# Patient Record
Sex: MALE | Race: BLACK OR AFRICAN AMERICAN | NOT HISPANIC OR LATINO | Employment: FULL TIME | URBAN - METROPOLITAN AREA
[De-identification: names, ages, dates, MRNs, and addresses within clinical notes are randomized per-mention and may not be internally consistent; named-entity substitution may affect disease eponyms.]

---

## 2023-06-26 ENCOUNTER — HOSPITAL ENCOUNTER (EMERGENCY)
Facility: CLINIC | Age: 38
Discharge: HOME | End: 2023-06-26
Attending: EMERGENCY MEDICINE | Admitting: EMERGENCY MEDICINE
Payer: COMMERCIAL

## 2023-06-26 VITALS
RESPIRATION RATE: 16 BRPM | HEIGHT: 68 IN | SYSTOLIC BLOOD PRESSURE: 106 MMHG | OXYGEN SATURATION: 97 % | BODY MASS INDEX: 21.22 KG/M2 | WEIGHT: 140 LBS | HEART RATE: 100 BPM | TEMPERATURE: 98.2 F | DIASTOLIC BLOOD PRESSURE: 71 MMHG

## 2023-06-26 DIAGNOSIS — H10.30 ACUTE CONJUNCTIVITIS, UNSPECIFIED ACUTE CONJUNCTIVITIS TYPE, UNSPECIFIED LATERALITY: ICD-10-CM

## 2023-06-26 PROCEDURE — 99283 EMERGENCY DEPT VISIT LOW MDM: CPT

## 2023-06-26 RX ORDER — ERYTHROMYCIN 5 MG/G
0.5 OINTMENT OPHTHALMIC 4 TIMES DAILY
Qty: 3.5 G | Refills: 0 | Status: SHIPPED | OUTPATIENT
Start: 2023-06-26 | End: 2023-08-01

## 2023-06-26 ASSESSMENT — ENCOUNTER SYMPTOMS
EYE DISCHARGE: 1
HEADACHES: 1
EYE REDNESS: 1
FEVER: 0
EYE ITCHING: 1

## 2023-06-27 NOTE — ED TRIAGE NOTES
Left eye redness, itching and clear drainage. Kids at home had similar symptoms       Triage Assessment     Row Name 06/26/23 2112       Triage Assessment (Adult)    Airway WDL WDL       Respiratory WDL    Respiratory WDL WDL       Skin Circulation/Temperature WDL    Skin Circulation/Temperature WDL WDL       Cardiac WDL    Cardiac WDL WDL       Peripheral/Neurovascular WDL    Peripheral Neurovascular WDL WDL       Cognitive/Neuro/Behavioral WDL    Cognitive/Neuro/Behavioral WDL WDL

## 2023-06-27 NOTE — ED PROVIDER NOTES
EMERGENCY DEPARTMENT ENCOUNTER      NAME: Jose Dang  AGE: 38 year old male  YOB: 1985  MRN: [id number]  EVALUATION DATE & TIME: 6/26/2023  9:40 PM    PCP: No Ref-Primary, Physician    ED PROVIDER: Markos Murray MD    Chief Complaint   Patient presents with     Eye Problem     Left      FINAL IMPRESSION:  1. Acute conjunctivitis, unspecified acute conjunctivitis type, unspecified laterality      ED COURSE & MEDICAL DECISION MAKING:    Pertinent Labs & Imaging studies reviewed. (See chart for details)  38 year old male presents to the Emergency Department for evaluation of left eye irritation redness and discharge.  Multiple family members at home sick with similar symptoms.  No other red flag symptoms on examination see below for further details on eye exam.  Reported no visual changes anterior chamber was clear visual acuity in visual fields unremarkable.  Most likely infectious mediated.  Our plan of care at this point is for discharge with monitoring of symptoms.  If persistent can start erythromycin ophthalmologic eye ointment although I did inform the patient that this was most likely to be viral mediated process I discussed with him expected timeframe and management resolution.  Patient comfortable to plan of care.  Return precaution discussed prior to discharge.     9:55 PM Met with and introduced myself to the patient. Discussed history and plan of care.    Medical Decision Making    History:    Supplemental history from: Documented in chart, if applicable    External Record(s) reviewed: Documented in chart, if applicable.    Work Up:    Chart documentation includes differential considered and any EKGs or imaging independently interpreted by provider, where specified.    In additional to work up documented, I considered the following work up: Documented in chart, if applicable.    External consultation:    Discussion of management with another provider: Documented in chart, if  applicable    Complicating factors:    Care impacted by chronic illness: N/A    Care affected by social determinants of health: N/A    Disposition considerations: Discharge. I prescribed additional prescription strength medication(s) as charted. See documentation for any additional details.    At the conclusion of the encounter I discussed the results of all of the tests and the disposition. The questions were answered. The patient or family acknowledged understanding and was agreeable with the care plan.     MEDICATIONS GIVEN IN THE EMERGENCY:  Medications - No data to display    NEW PRESCRIPTIONS STARTED AT TODAY'S ER VISIT  Discharge Medication List as of 6/26/2023 10:03 PM      START taking these medications    Details   erythromycin (ROMYCIN) 5 MG/GM ophthalmic ointment Place 0.5 inches Into the left eye 4 times dailyDisp-3.5 g, R-0Local Print                =================================================================    HPI    Patient information was obtained from: Patient    Use of : N/A       Jose Dang is a 38 year old male with a pertinent history of GERD who presents to this ED via walk in for evaluation of eye problem.    The patient reports left eye pain, redness, itching, and clear drainage that began earlier today. Endorses an associated headache. States that his children at home have similar symptoms. No medical problems noted.    Denies fever, vision changes, or any other complaints at this time.    REVIEW OF SYSTEMS   Review of Systems   Constitutional: Negative for fever.   Eyes: Positive for discharge (left), redness (left) and itching (left). Negative for visual disturbance.   Neurological: Positive for headaches.   All other systems reviewed and are negative.     PAST MEDICAL HISTORY:  Past Medical History:   Diagnosis Date     Gastro-oesophageal reflux disease        PAST SURGICAL HISTORY:  History reviewed. No pertinent surgical history.        CURRENT MEDICATIONS:   "  erythromycin (ROMYCIN) 5 MG/GM ophthalmic ointment  HYDROcodone-acetaminophen (NORCO) 5-325 MG per tablet  Ranitidine HCl (ZANTAC PO)        ALLERGIES:  No Known Allergies    FAMILY HISTORY:  History reviewed. No pertinent family history.    SOCIAL HISTORY:   Social History     Socioeconomic History     Marital status:    Tobacco Use     Smoking status: Never   Substance and Sexual Activity     Alcohol use: No     Drug use: No     Comment: noka piupe     Sexual activity: Yes     Partners: Female       PHYSICAL EXAM    VITAL SIGNS: /71   Pulse 100   Temp 98.2  F (36.8  C) (Oral)   Resp 16   Ht 1.727 m (5' 8\")   Wt 63.5 kg (140 lb)   SpO2 97%   BMI 21.29 kg/m    Constitutional:  Well developed, well nourished   EYES: Conjunctival injection on the left side anterior chambers clear pupils equal and reactive extraocular movements intact without eliciting any discomfort visual fields grossly normal visual acuity grossly normal.  No other concerning exam findings.  Watery discharge noted primarily in the medial aspect of the eye.  HENT: Atraumatic, normocephalic, bilateral external ears normal.  Oropharynx moist. Nose normal.   Neck: Normal ROM , Supple   Respiratory:  No respiratory distress, normal nonlabored respirations.   Cardiovascular:  Distal perfusion appears intact  Musculoskeletal:  No edema appreciated, Good range of motion in all major joints.   Integument:  Warm, Dry, No erythema, No rash.   Neurologic:  Alert and oriented. No focal deficits noted.  Ambulatory  Psychiatric:  Affect normal, Judgment normal, Mood normal.    I, Lynette Robin, am serving as a scribe to document services personally performed by Markos Murray MD, based on my observation and the provider's statements to me. I, Markos Murray MD, attest that Lynette Robin is acting in a scribe capacity, has observed my performance of the services and has documented them in accordance with my direction.    Markos Murray, " MD  [facility] EMERGENCY ROOM  [address] [place] [address]  [phone]     Markos Murray MD  06/26/23 8567

## 2023-06-28 ENCOUNTER — OFFICE VISIT (OUTPATIENT)
Dept: FAMILY MEDICINE | Facility: CLINIC | Age: 38
End: 2023-06-28
Payer: COMMERCIAL

## 2023-06-28 VITALS
SYSTOLIC BLOOD PRESSURE: 114 MMHG | TEMPERATURE: 97.9 F | WEIGHT: 147 LBS | HEART RATE: 60 BPM | BODY MASS INDEX: 22.35 KG/M2 | RESPIRATION RATE: 11 BRPM | DIASTOLIC BLOOD PRESSURE: 78 MMHG | OXYGEN SATURATION: 98 %

## 2023-06-28 DIAGNOSIS — J30.89 NON-SEASONAL ALLERGIC RHINITIS, UNSPECIFIED TRIGGER: ICD-10-CM

## 2023-06-28 DIAGNOSIS — H10.13 ALLERGIC CONJUNCTIVITIS, BILATERAL: Primary | ICD-10-CM

## 2023-06-28 PROCEDURE — 99204 OFFICE O/P NEW MOD 45 MIN: CPT | Performed by: PHYSICIAN ASSISTANT

## 2023-06-28 RX ORDER — METHYLPREDNISOLONE 4 MG
TABLET, DOSE PACK ORAL
Qty: 21 TABLET | Refills: 0 | Status: SHIPPED | OUTPATIENT
Start: 2023-06-28 | End: 2023-08-01

## 2023-06-28 RX ORDER — CETIRIZINE HYDROCHLORIDE 10 MG/1
10 TABLET ORAL DAILY
Qty: 30 TABLET | Refills: 0 | Status: SHIPPED | OUTPATIENT
Start: 2023-06-28 | End: 2023-08-01

## 2023-06-28 RX ORDER — OLOPATADINE HYDROCHLORIDE 1 MG/ML
1 SOLUTION/ DROPS OPHTHALMIC 2 TIMES DAILY
Qty: 5 ML | Refills: 0 | Status: SHIPPED | OUTPATIENT
Start: 2023-06-28 | End: 2023-08-01

## 2023-06-28 NOTE — PROGRESS NOTES
Patient presents with:  Pharyngitis: Went to ER two days ago for pink eye, was initially one eye but now its both. Has sore throat now.     (H10.13) Allergic conjunctivitis, bilateral  (primary encounter diagnosis)  Comment:   Plan: olopatadine (PATANOL) 0.1 % ophthalmic solution          STOP erythromycin eye ointment    (J30.89) Non-seasonal allergic rhinitis, unspecified trigger  Comment:   Plan: cetirizine (ZYRTEC) 10 MG tablet,         methylPREDNISolone (MEDROL DOSEPAK) 4 MG tablet        therapy pack          Take the cetirizine every night for the next 3-4 weeks.      Cool washcloth to eyes as needed to relieve itching.  Avoid rubbing eyes with your hands.      Follow up with your primary clinic should symptoms persist or worsen.         SUBJECTIVE:   Jose Dang is a 38 year old male who presents today with persistent red eyes, worsened since using the erythromycin ophthalmic ointment in that was prescribed at the ER 2 days ago.  He complains of persistent watery eyes with itching.  Denies any photophobia.  Denies any fevers.  Denies any purulent drainage from his eyes.    Patient consults with his wife on the phone, during visit.  Also has her ask me questions during visit and asked me to explain the diagnosis to her.    Past Medical History:   Diagnosis Date     Gastro-oesophageal reflux disease          Current Outpatient Medications   Medication Sig Dispense Refill     Multiple Vitamins-Iron (DAILY-BALDO/IRON/BETA-CAROTENE) TABS TAKE 1 TABLET BY MOUTH DAILY. (Patient not taking: Reported on 10/19/2020) 30 tablet 7     Social History     Tobacco Use     Smoking status: Never Smoker     Smokeless tobacco: Never Used   Substance Use Topics     Alcohol use: Not on file     Family History   Problem Relation Age of Onset     Diabetes Mother      Diabetes Father          ROS:    10 point ROS of systems including Constitutional, Eyes, Respiratory, Cardiovascular, Gastroenterology, Genitourinary,  Integumentary, Muscularskeletal, Psychiatric ,neurological were all negative except for pertinent positives noted in my HPI       OBJECTIVE:  /78 (BP Location: Right arm, Patient Position: Sitting, Cuff Size: Adult Regular)   Pulse 60   Temp 97.9  F (36.6  C) (Oral)   Resp 11   Wt 66.7 kg (147 lb)   SpO2 98%   BMI 22.35 kg/m    Physical Exam:  GENERAL APPEARANCE: healthy, alert and no distress  EYES: EOMI,  PERRL, conjunctiva injected bilaterally with watery drainage.  Globes are nontender to palpation.  No photophobia  HENT: ear canals and TM's normal.  Nose with boggy turbinates and clear coryza and mouth without ulcers, erythema or lesions  OP: Postnasal drip  NECK: supple, nontender, no lymphadenopathy  SKIN: no suspicious lesions or rashes

## 2023-06-28 NOTE — PATIENT INSTRUCTIONS
(H10.13) Allergic conjunctivitis, bilateral  (primary encounter diagnosis)  Comment:   Plan: olopatadine (PATANOL) 0.1 % ophthalmic solution          STOP erythromycin eye ointment    (J30.89) Non-seasonal allergic rhinitis, unspecified trigger  Comment:   Plan: cetirizine (ZYRTEC) 10 MG tablet,         methylPREDNISolone (MEDROL DOSEPAK) 4 MG tablet        therapy pack          Take the cetirizine every night for the next 3-4 weeks.      Cool washcloth to eyes as needed to relieve itching.  Avoid rubbing eyes with your hands.      Follow up with your primary clinic should symptoms persist or worsen.

## 2023-08-01 ENCOUNTER — OFFICE VISIT (OUTPATIENT)
Dept: INTERNAL MEDICINE | Facility: CLINIC | Age: 38
End: 2023-08-01
Payer: COMMERCIAL

## 2023-08-01 VITALS
TEMPERATURE: 97 F | OXYGEN SATURATION: 97 % | WEIGHT: 146 LBS | SYSTOLIC BLOOD PRESSURE: 116 MMHG | BODY MASS INDEX: 22.2 KG/M2 | DIASTOLIC BLOOD PRESSURE: 74 MMHG | HEART RATE: 69 BPM

## 2023-08-01 DIAGNOSIS — R30.0 DYSURIA: ICD-10-CM

## 2023-08-01 DIAGNOSIS — Z13.220 SCREENING FOR HYPERLIPIDEMIA: ICD-10-CM

## 2023-08-01 DIAGNOSIS — K64.4 EXTERNAL HEMORRHOIDS: ICD-10-CM

## 2023-08-01 DIAGNOSIS — Z11.3 SCREEN FOR STD (SEXUALLY TRANSMITTED DISEASE): ICD-10-CM

## 2023-08-01 DIAGNOSIS — Z00.00 ROUTINE HISTORY AND PHYSICAL EXAMINATION OF ADULT: Primary | ICD-10-CM

## 2023-08-01 DIAGNOSIS — G89.29 CHRONIC PAIN OF RIGHT KNEE: ICD-10-CM

## 2023-08-01 DIAGNOSIS — K21.00 GASTROESOPHAGEAL REFLUX DISEASE WITH ESOPHAGITIS WITHOUT HEMORRHAGE: ICD-10-CM

## 2023-08-01 DIAGNOSIS — Z11.59 NEED FOR HEPATITIS C SCREENING TEST: ICD-10-CM

## 2023-08-01 DIAGNOSIS — Z11.4 SCREENING FOR HIV (HUMAN IMMUNODEFICIENCY VIRUS): ICD-10-CM

## 2023-08-01 DIAGNOSIS — M25.561 CHRONIC PAIN OF RIGHT KNEE: ICD-10-CM

## 2023-08-01 DIAGNOSIS — Z13.1 SCREENING FOR DIABETES MELLITUS: ICD-10-CM

## 2023-08-01 LAB
ALBUMIN UR-MCNC: NEGATIVE MG/DL
APPEARANCE UR: CLEAR
BACTERIA #/AREA URNS HPF: ABNORMAL /HPF
BILIRUB UR QL STRIP: NEGATIVE
CHOLEST SERPL-MCNC: 263 MG/DL
COLOR UR AUTO: YELLOW
GLUCOSE UR STRIP-MCNC: NEGATIVE MG/DL
HBA1C MFR BLD: 5.9 % (ref 0–5.6)
HDLC SERPL-MCNC: 69 MG/DL
HGB UR QL STRIP: NEGATIVE
KETONES UR STRIP-MCNC: NEGATIVE MG/DL
LDLC SERPL CALC-MCNC: 166 MG/DL
LEUKOCYTE ESTERASE UR QL STRIP: ABNORMAL
NITRATE UR QL: NEGATIVE
NONHDLC SERPL-MCNC: 194 MG/DL
PH UR STRIP: 6 [PH] (ref 5–7)
RBC #/AREA URNS AUTO: ABNORMAL /HPF
SP GR UR STRIP: 1.02 (ref 1–1.03)
SQUAMOUS #/AREA URNS AUTO: ABNORMAL /LPF
TRIGL SERPL-MCNC: 138 MG/DL
UROBILINOGEN UR STRIP-ACNC: 0.2 E.U./DL
WBC #/AREA URNS AUTO: ABNORMAL /HPF

## 2023-08-01 PROCEDURE — 87389 HIV-1 AG W/HIV-1&-2 AB AG IA: CPT | Performed by: INTERNAL MEDICINE

## 2023-08-01 PROCEDURE — 99395 PREV VISIT EST AGE 18-39: CPT | Performed by: INTERNAL MEDICINE

## 2023-08-01 PROCEDURE — 99214 OFFICE O/P EST MOD 30 MIN: CPT | Mod: 25 | Performed by: INTERNAL MEDICINE

## 2023-08-01 PROCEDURE — 36415 COLL VENOUS BLD VENIPUNCTURE: CPT | Performed by: INTERNAL MEDICINE

## 2023-08-01 PROCEDURE — 87491 CHLMYD TRACH DNA AMP PROBE: CPT | Performed by: INTERNAL MEDICINE

## 2023-08-01 PROCEDURE — 80061 LIPID PANEL: CPT | Performed by: INTERNAL MEDICINE

## 2023-08-01 PROCEDURE — 81001 URINALYSIS AUTO W/SCOPE: CPT | Performed by: INTERNAL MEDICINE

## 2023-08-01 PROCEDURE — 86803 HEPATITIS C AB TEST: CPT | Performed by: INTERNAL MEDICINE

## 2023-08-01 PROCEDURE — 87591 N.GONORRHOEAE DNA AMP PROB: CPT | Performed by: INTERNAL MEDICINE

## 2023-08-01 PROCEDURE — 86780 TREPONEMA PALLIDUM: CPT | Performed by: INTERNAL MEDICINE

## 2023-08-01 PROCEDURE — 83036 HEMOGLOBIN GLYCOSYLATED A1C: CPT | Performed by: INTERNAL MEDICINE

## 2023-08-01 RX ORDER — PANTOPRAZOLE SODIUM 40 MG/1
40 TABLET, DELAYED RELEASE ORAL DAILY
Qty: 90 TABLET | Refills: 0 | Status: SHIPPED | OUTPATIENT
Start: 2023-08-01 | End: 2023-10-27

## 2023-08-01 RX ORDER — HYDROCORTISONE ACETATE 25 MG/1
25 SUPPOSITORY RECTAL 2 TIMES DAILY
Qty: 24 SUPPOSITORY | Refills: 0 | Status: SHIPPED | OUTPATIENT
Start: 2023-08-01

## 2023-08-01 ASSESSMENT — ENCOUNTER SYMPTOMS
PALPITATIONS: 0
HEMATURIA: 0
WEAKNESS: 0
DIARRHEA: 0
NERVOUS/ANXIOUS: 0
HEARTBURN: 1
COUGH: 0
MYALGIAS: 0
EYE PAIN: 0
FREQUENCY: 0
FEVER: 0
CHILLS: 0
JOINT SWELLING: 0
ABDOMINAL PAIN: 0
HEMATOCHEZIA: 0
SHORTNESS OF BREATH: 0
ARTHRALGIAS: 1
HEADACHES: 0
SORE THROAT: 0
CONSTIPATION: 0
DIZZINESS: 0
PARESTHESIAS: 0
DYSURIA: 1
NAUSEA: 0

## 2023-08-01 NOTE — PROGRESS NOTES
SUBJECTIVE:   CC: Jose is an 38 year old who presents for preventative health visit.       8/1/2023     2:35 PM   Additional Questions   Roomed by Divya       Healthy Habits:     Getting at least 3 servings of Calcium per day:  Yes    Bi-annual eye exam:  NO    Dental care twice a year:  NO    Sleep apnea or symptoms of sleep apnea:  None    Diet:  Regular (no restrictions)    Frequency of exercise:  None    Taking medications regularly:  Yes    Medication side effects:  Not applicable    Additional concerns today:  No      Today's PHQ-2 Score:       8/1/2023     2:30 PM   PHQ-2 ( 1999 [employer])   Q1: Little interest or pleasure in doing things 0   Q2: Feeling down, depressed or hopeless 0   PHQ-2 Score 0   Q1: Little interest or pleasure in doing things Not at all   Q2: Feeling down, depressed or hopeless Not at all   PHQ-2 Score 0         Patient presents today for a physical and multiple concerns.      His main complaint is of burning on urination for the last 1 month.  Feels like he has to  strain on urination  Denies any lesions in the groin area  No prior history of STD    Complaining of right knee pain for at least 2 years  Pain is worse when he stretches it  He feels clicking, pain is on the medial side of the  Elicited 6-7 out of  He states he suffered an injury biking soccer in 2006 and pain started gradually since then  He never had any evaluation for this problem so far    Complaining of hemorrhoids pain with intermittent bleeding for the last 1 year    Complaining of acid reflux.          Patient advised to follow-up with home and stated that he is complaining of burning pain over his feet for the last 20  Patient currently refusing food    But he is currently working as a               Have you ever done Advance Care Planning? (For example, a Health Directive, POLST, or a discussion with a medical provider or your loved ones about your wishes):    Social History     Tobacco Use    Smoking  status: Former     Packs/day: 0.25     Types: Cigarettes    Smokeless tobacco: Current     Types: Chew   Substance Use Topics    Alcohol use: No             8/1/2023     2:29 PM   Alcohol Use   Prescreen: >3 drinks/day or >7 drinks/week? No       Last PSA: No results found for: PSA    Reviewed orders with patient. Reviewed health maintenance and updated orders accordingly - Yes      Reviewed and updated as needed this visit by clinical staff   Tobacco  Allergies  Meds              Reviewed and updated as needed this visit by Provider                 Past Medical History:   Diagnosis Date    Gastro-oesophageal reflux disease         Review of Systems   Constitutional:  Negative for chills and fever.   HENT:  Negative for congestion, ear pain, hearing loss and sore throat.    Eyes:  Negative for pain and visual disturbance.   Respiratory:  Negative for cough and shortness of breath.    Cardiovascular:  Negative for chest pain, palpitations and peripheral edema.   Gastrointestinal:  Positive for heartburn. Negative for abdominal pain, constipation, diarrhea, hematochezia and nausea.   Genitourinary:  Positive for dysuria. Negative for frequency, genital sores, hematuria, impotence, penile discharge and urgency.   Musculoskeletal:  Positive for arthralgias. Negative for joint swelling and myalgias.   Skin:  Negative for rash.   Neurological:  Negative for dizziness, weakness, headaches and paresthesias.   Psychiatric/Behavioral:  Negative for mood changes. The patient is not nervous/anxious.      CONSTITUTIONAL: NEGATIVE for fever, chills, change in weight  INTEGUMENTARY/SKIN: NEGATIVE for worrisome rashes, moles or lesions  EYES: NEGATIVE for vision changes or irritation  ENT: NEGATIVE for ear, mouth and throat problems  RESP: NEGATIVE for significant cough or SOB  CV: NEGATIVE for chest pain, palpitations or peripheral edema  GI: NEGATIVE for nausea, abdominal pain, heartburn, or change in bowel habits   male:  negative for dysuria, hematuria, decreased urinary stream, erectile dysfunction, urethral discharge  MUSCULOSKELETAL: NEGATIVE for significant arthralgias or myalgia  NEURO: NEGATIVE for weakness, dizziness or paresthesias  PSYCHIATRIC: NEGATIVE for changes in mood or affect    OBJECTIVE:   /74   Pulse 69   Temp 97  F (36.1  C) (Tympanic)   Wt 66.2 kg (146 lb)   SpO2 97%   BMI 22.20 kg/m      Physical Exam  GENERAL: healthy, alert and no distress  EYES: Eyes grossly normal to inspection, PERRL and conjunctivae and sclerae normal  HENT: ear canals and TM's normal, nose and mouth without ulcers or lesions  NECK: no adenopathy, no asymmetry, masses, or scars and thyroid normal to palpation  RESP: lungs clear to auscultation - no rales, rhonchi or wheezes  CV: regular rate and rhythm, normal S1 S2, no S3 or S4, no murmur, click or rub, no peripheral edema and peripheral pulses strong  ABDOMEN: soft, nontender, no hepatosplenomegaly, no masses and bowel sounds normal  MS: no gross musculoskeletal defects noted, no edema  SKIN: no suspicious lesions or rashes  NEURO: Normal strength and tone, mentation intact and speech normal  PSYCH: mentation appears normal, affect normal/bright    Diagnostic Test Results:  Labs reviewed in Epic    ASSESSMENT/PLAN:       ICD-10-CM    1. Routine history and physical examination of adult  Z00.00       2. Screening for HIV (human immunodeficiency virus)  Z11.4            3. Need for hepatitis C screening test  Z11.59 Hep C ordered           4. Screening for hyperlipidemia  Z13.220 Lipid panel ordered           5. Dysuria  R30.0 UA ordered, Urine or GC ordered                                    6. Gastroesophageal reflux disease with esophagitis without hemorrhage  K21.00 Trial of Protonix  Low spice diet      7. External hemorrhoids  K64.4 Anusol suppository recommended      8. Chronic pain of right knee  M25.561 Worsening of pain, advised Mri of the knee  Brace    G89.29        9. Screen for STD (sexually transmitted disease)  Z11.3 HIV and Hep C ordered           10. Screening for diabetes mellitus  Z13.1 Due to family H/O DM, A1C ordered               Patient has been advised of split billing requirements and indicates understanding: Yes      COUNSELING:   Reviewed preventive health counseling, as reflected in patient instructions        He reports that he has quit smoking. His smoking use included cigarettes. He smoked an average of .25 packs per day. His smokeless tobacco use includes chew.            Viviana Baugh MD  [facility]

## 2023-08-02 LAB
C TRACH DNA SPEC QL NAA+PROBE: NEGATIVE
HCV AB SERPL QL IA: NONREACTIVE
HIV 1+2 AB+HIV1 P24 AG SERPL QL IA: NONREACTIVE
N GONORRHOEA DNA SPEC QL NAA+PROBE: NEGATIVE
T PALLIDUM AB SER QL: NONREACTIVE

## 2023-08-14 ENCOUNTER — OFFICE VISIT (OUTPATIENT)
Dept: URGENT CARE | Facility: URGENT CARE | Age: 38
End: 2023-08-14
Payer: COMMERCIAL

## 2023-08-14 VITALS
DIASTOLIC BLOOD PRESSURE: 66 MMHG | OXYGEN SATURATION: 99 % | HEART RATE: 82 BPM | TEMPERATURE: 97.2 F | BODY MASS INDEX: 22.05 KG/M2 | WEIGHT: 145 LBS | SYSTOLIC BLOOD PRESSURE: 99 MMHG

## 2023-08-14 DIAGNOSIS — R05.9 COUGH, UNSPECIFIED TYPE: Primary | ICD-10-CM

## 2023-08-14 PROCEDURE — 99213 OFFICE O/P EST LOW 20 MIN: CPT | Performed by: FAMILY MEDICINE

## 2023-08-14 RX ORDER — DOXYCYCLINE HYCLATE 100 MG
100 TABLET ORAL 2 TIMES DAILY
Qty: 14 TABLET | Refills: 0 | Status: SHIPPED | OUTPATIENT
Start: 2023-08-14 | End: 2023-08-21

## 2023-08-14 RX ORDER — BENZONATATE 100 MG/1
100 CAPSULE ORAL 3 TIMES DAILY PRN
Qty: 21 CAPSULE | Refills: 1 | Status: SHIPPED | OUTPATIENT
Start: 2023-08-14

## 2023-08-14 NOTE — PROGRESS NOTES
Assessment & Plan     Cough, unspecified type  - benzonatate (TESSALON) 100 MG capsule  Dispense: 21 capsule; Refill: 1  - doxycycline hyclate (VIBRA-TABS) 100 MG tablet  Dispense: 14 tablet; Refill: 0     Clear lungs, stable vitals. No respiratory distress. Recommended tessalon/DM for cough symptoms for a presumed viral illness.    Can start doxy in 3 days if cough not improving, should cover for atypical infection/walking pneumonia.     Edgar Flores MD   [place] [facility]    Meeun Garcia is a 38 year old male who presents to clinic today for the following health issues:  Chief Complaint   Patient presents with    Urgent Care     Pt states he has Cough, fever, headache, some SOB about 7 days now. OTC meds not helping.      HPI    Patient presenting with cough for 1 week had a subjective fever yesterday, although not recorded temperature.  Reports the cough is persistent , mostly dry, sore throat present but no difficulty with opening mouth. No known exposures to COVID. No hx of respiratory disease. MIld headache.     There are no problems to display for this patient.      Current Outpatient Medications   Medication    benzonatate (TESSALON) 100 MG capsule    doxycycline hyclate (VIBRA-TABS) 100 MG tablet    hydrocortisone (ANUSOL-HC) 25 MG suppository    pantoprazole (PROTONIX) 40 MG EC tablet    Ranitidine HCl (ZANTAC PO)     No current facility-administered medications for this visit.           Objective    BP 99/66 (BP Location: Right arm, Patient Position: Sitting, Cuff Size: Adult Regular)   Pulse 82   Temp 97.2  F (36.2  C) (Tympanic)   Wt 65.8 kg (145 lb)   SpO2 99%   BMI 22.05 kg/m    Physical Exam         CV: RRR no m/r/g  Pulm: clear bilaterally  GEN: NAD  Throat: no enlarged tonsils no trismus  Ears: normal TM    No results found for any visits on 08/14/23.              The use of Dragon/Kidblogation services may have been used to construct the content in this note; any  grammatical or spelling errors are non-intentional. Please contact the author of this note directly if you are in need of any clarification.

## 2023-08-14 NOTE — PATIENT INSTRUCTIONS
For cough (can take all of them together):   - Dextromethorphan 'DM' (over the counter - can be found in Robitussin/Delsym/generic cough meds)  - Honey: lozenges/cough drops or mix honey in warm water  - Tessalon/Benzonatate (prescription) every 8 hours as needed      If cough doesn't get better in next few days then start the doxycycline

## 2023-10-27 DIAGNOSIS — K21.00 GASTROESOPHAGEAL REFLUX DISEASE WITH ESOPHAGITIS WITHOUT HEMORRHAGE: ICD-10-CM

## 2023-10-27 RX ORDER — PANTOPRAZOLE SODIUM 40 MG/1
40 TABLET, DELAYED RELEASE ORAL DAILY
Qty: 90 TABLET | Refills: 0 | Status: SHIPPED | OUTPATIENT
Start: 2023-10-27

## 2024-11-09 ENCOUNTER — HEALTH MAINTENANCE LETTER (OUTPATIENT)
Age: 39
End: 2024-11-09

## 2025-04-29 ENCOUNTER — OFFICE VISIT (OUTPATIENT)
Dept: URGENT CARE | Facility: URGENT CARE | Age: 40
End: 2025-04-29
Payer: COMMERCIAL

## 2025-04-29 VITALS
DIASTOLIC BLOOD PRESSURE: 76 MMHG | WEIGHT: 151.9 LBS | TEMPERATURE: 97.2 F | OXYGEN SATURATION: 98 % | BODY MASS INDEX: 23.1 KG/M2 | RESPIRATION RATE: 24 BRPM | HEART RATE: 76 BPM | SYSTOLIC BLOOD PRESSURE: 121 MMHG

## 2025-04-29 DIAGNOSIS — R30.0 DYSURIA: Primary | ICD-10-CM

## 2025-04-29 DIAGNOSIS — M54.9 UPPER BACK PAIN ON RIGHT SIDE: ICD-10-CM

## 2025-04-29 DIAGNOSIS — M25.511 RIGHT SHOULDER PAIN, UNSPECIFIED CHRONICITY: ICD-10-CM

## 2025-04-29 DIAGNOSIS — R53.83 OTHER FATIGUE: ICD-10-CM

## 2025-04-29 DIAGNOSIS — R35.0 URINARY FREQUENCY: ICD-10-CM

## 2025-04-29 LAB
ALBUMIN UR-MCNC: NEGATIVE MG/DL
ANION GAP SERPL CALCULATED.3IONS-SCNC: <1 MMOL/L (ref 3–14)
APPEARANCE UR: CLEAR
BASOPHILS # BLD AUTO: 0 10E3/UL (ref 0–0.2)
BASOPHILS NFR BLD AUTO: 0 %
BILIRUB UR QL STRIP: NEGATIVE
BUN SERPL-MCNC: 14 MG/DL (ref 7–30)
CALCIUM SERPL-MCNC: 9.5 MG/DL (ref 8.5–10.1)
CHLORIDE BLD-SCNC: 106 MMOL/L (ref 94–109)
CO2 SERPL-SCNC: 29 MMOL/L (ref 20–32)
COLOR UR AUTO: YELLOW
CREAT SERPL-MCNC: 1 MG/DL (ref 0.66–1.25)
EGFRCR SERPLBLD CKD-EPI 2021: >90 ML/MIN/1.73M2
EOSINOPHIL # BLD AUTO: 0.3 10E3/UL (ref 0–0.7)
EOSINOPHIL NFR BLD AUTO: 4 %
ERYTHROCYTE [DISTWIDTH] IN BLOOD BY AUTOMATED COUNT: 14.3 % (ref 10–15)
EST. AVERAGE GLUCOSE BLD GHB EST-MCNC: 120 MG/DL
GLUCOSE BLD-MCNC: 96 MG/DL (ref 70–99)
GLUCOSE UR STRIP-MCNC: NEGATIVE MG/DL
HBA1C MFR BLD: 5.8 % (ref 0–5.6)
HCT VFR BLD AUTO: 44.5 % (ref 40–53)
HGB BLD-MCNC: 14.7 G/DL (ref 13.3–17.7)
HGB UR QL STRIP: NEGATIVE
IMM GRANULOCYTES # BLD: 0 10E3/UL
IMM GRANULOCYTES NFR BLD: 0 %
KETONES UR STRIP-MCNC: NEGATIVE MG/DL
LEUKOCYTE ESTERASE UR QL STRIP: NEGATIVE
LYMPHOCYTES # BLD AUTO: 3.2 10E3/UL (ref 0.8–5.3)
LYMPHOCYTES NFR BLD AUTO: 37 %
MCH RBC QN AUTO: 28.2 PG (ref 26.5–33)
MCHC RBC AUTO-ENTMCNC: 33 G/DL (ref 31.5–36.5)
MCV RBC AUTO: 85 FL (ref 78–100)
MONOCYTES # BLD AUTO: 0.8 10E3/UL (ref 0–1.3)
MONOCYTES NFR BLD AUTO: 9 %
NEUTROPHILS # BLD AUTO: 4.4 10E3/UL (ref 1.6–8.3)
NEUTROPHILS NFR BLD AUTO: 50 %
NITRATE UR QL: NEGATIVE
PH UR STRIP: 6 [PH] (ref 5–7)
PLATELET # BLD AUTO: 395 10E3/UL (ref 150–450)
POTASSIUM BLD-SCNC: 4.4 MMOL/L (ref 3.4–5.3)
RBC # BLD AUTO: 5.21 10E6/UL (ref 4.4–5.9)
SODIUM SERPL-SCNC: 135 MMOL/L (ref 135–145)
SP GR UR STRIP: 1.02 (ref 1–1.03)
UROBILINOGEN UR STRIP-ACNC: 0.2 E.U./DL
WBC # BLD AUTO: 8.8 10E3/UL (ref 4–11)

## 2025-04-29 PROCEDURE — 81003 URINALYSIS AUTO W/O SCOPE: CPT | Performed by: PHYSICIAN ASSISTANT

## 2025-04-29 PROCEDURE — 3078F DIAST BP <80 MM HG: CPT | Performed by: PHYSICIAN ASSISTANT

## 2025-04-29 PROCEDURE — 36415 COLL VENOUS BLD VENIPUNCTURE: CPT | Performed by: PHYSICIAN ASSISTANT

## 2025-04-29 PROCEDURE — 85025 COMPLETE CBC W/AUTO DIFF WBC: CPT | Performed by: PHYSICIAN ASSISTANT

## 2025-04-29 PROCEDURE — 99214 OFFICE O/P EST MOD 30 MIN: CPT | Performed by: PHYSICIAN ASSISTANT

## 2025-04-29 PROCEDURE — 83036 HEMOGLOBIN GLYCOSYLATED A1C: CPT | Performed by: PHYSICIAN ASSISTANT

## 2025-04-29 PROCEDURE — 80048 BASIC METABOLIC PNL TOTAL CA: CPT | Performed by: PHYSICIAN ASSISTANT

## 2025-04-29 PROCEDURE — 3074F SYST BP LT 130 MM HG: CPT | Performed by: PHYSICIAN ASSISTANT

## 2025-04-29 RX ORDER — CELECOXIB 200 MG/1
200 CAPSULE ORAL DAILY
Qty: 14 CAPSULE | Refills: 0 | Status: SHIPPED | OUTPATIENT
Start: 2025-04-29 | End: 2025-05-13

## 2025-04-29 RX ORDER — DOXYCYCLINE HYCLATE 100 MG
100 TABLET ORAL 2 TIMES DAILY
Qty: 28 TABLET | Refills: 0 | Status: SHIPPED | OUTPATIENT
Start: 2025-04-29 | End: 2025-05-13

## 2025-04-29 NOTE — PROGRESS NOTES
Assessment & Plan     Dysuria    UA is neg  BMP is normal  STD deferred    - UA Macroscopic with reflex to Microscopic and Culture  - doxycycline hyclate (VIBRA-TABS) 100 MG tablet  Dispense: 28 tablet; Refill: 0  - Basic metabolic panel  (Ca, Cl, CO2, Creat, Gluc, K, Na, BUN)  - Basic metabolic panel  (Ca, Cl, CO2, Creat, Gluc, K, Na, BUN)    Upper back pain on right side    Different problems with the bones, muscles, nerves, ligaments, and tendons in the body can cause pain. One or more areas of your body may ache or burn, or feel tired, stiff, or sore. This is called musculoskeletal pain.  This type of pain can have many different causes. Sometimes it's caused by an injury such as a strain or sprain. Or it might by caused by using one part of your body in the same way over and over again (overuse).    - celecoxib (CELEBREX) 200 MG capsule  Dispense: 14 capsule; Refill: 0    Right shoulder pain, unspecified chronicity    Ice and ROM exercises  - celecoxib (CELEBREX) 200 MG capsule  Dispense: 14 capsule; Refill: 0    Other fatigue    CBC neg for anemia  TSH pending  UA normal  BMP neg for renal problems      Urinary frequency    A1c shows borderline diabetes  - Hemoglobin A1c  - Hemoglobin A1c       At today's visit with Jose Dang , we discussed results, diagnosis, medications and formulated a plan.  We also discussed red flags for immediate return to clinic/ER, as well as indications for follow up with PCP if not improved in 3 days. Patient understood and agreed to plan. Josetoney Dang was discharged with stable vitals and has no further questions.       No follow-ups on file.    Nico Hsu, [facility], PA-C  [facility] JAMEE Garcia is a 40 year old male who presents to clinic today for the following health issues:  Chief Complaint   Patient presents with    Urgent Care     Feeling low energy  for over a week now. For a while has been having urination issue. Urgency,  frequency, burning. Discomfort all over body. Couple of was having a fever here and there. Not having and abdominal pain or flank.          4/29/2025     5:16 PM   Additional Questions   Roomed by Nga   Accompanied by Wife     HPI  Review of Systems  Constitutional, HEENT, cardiovascular, pulmonary, GI, , musculoskeletal, neuro, skin, endocrine and psych systems are negative, except as otherwise noted.      Objective    /76 (BP Location: Right arm)   Pulse 76   Temp 97.2  F (36.2  C) (Tympanic)   Resp 24   Wt 68.9 kg (151 lb 14.4 oz)   SpO2 98%   BMI 23.10 kg/m    Physical Exam   GENERAL: alert and no distress  EYES: Eyes grossly normal to inspection, PERRL and conjunctivae and sclerae normal  HENT: ear canals and TM's normal, nose and mouth without ulcers or lesions  NECK: no adenopathy, no asymmetry, masses, or scars  RESP: lungs clear to auscultation - no rales, rhonchi or wheezes  CV: regular rate and rhythm, normal S1 S2, no S3 or S4, no murmur, click or rub, no peripheral edema  ABDOMEN: soft, nontender, no hepatosplenomegaly, no masses and bowel sounds normal  MS: no gross musculoskeletal defects noted, no edema  SKIN: no suspicious lesions or rashes  NEURO: Normal strength and tone, mentation intact and speech normal  PSYCH: mentation appears normal, affect normal/bright      Results for orders placed or performed in visit on 04/29/25   UA Macroscopic with reflex to Microscopic and Culture     Status: Normal    Specimen: Urine, Clean Catch   Result Value Ref Range    Color Urine Yellow Colorless, Straw, Light Yellow, Yellow    Appearance Urine Clear Clear    Glucose Urine Negative Negative mg/dL    Bilirubin Urine Negative Negative    Ketones Urine Negative Negative mg/dL    Specific Gravity Urine 1.020 1.003 - 1.035    Blood Urine Negative Negative    pH Urine 6.0 5.0 - 7.0    Protein Albumin Urine Negative Negative mg/dL    Urobilinogen Urine 0.2 0.2, 1.0 E.U./dL    Nitrite Urine Negative  Negative    Leukocyte Esterase Urine Negative Negative    Narrative    Microscopic not indicated   Basic metabolic panel  (Ca, Cl, CO2, Creat, Gluc, K, Na, BUN)     Status: Abnormal   Result Value Ref Range    Sodium 135 135 - 145 mmol/L    Potassium 4.4 3.4 - 5.3 mmol/L    Chloride 106 94 - 109 mmol/L    Carbon Dioxide (CO2) 29 20 - 32 mmol/L    Anion Gap <1 (L) 3 - 14 mmol/L    Urea Nitrogen 14 7 - 30 mg/dL    Creatinine 1.00 0.66 - 1.25 mg/dL    GFR Estimate >90 >60 mL/min/1.73m2    Calcium 9.5 8.5 - 10.1 mg/dL    Glucose 96 70 - 99 mg/dL   Hemoglobin A1c     Status: Abnormal   Result Value Ref Range    Estimated Average Glucose 120 (H) <117 mg/dL    Hemoglobin A1C 5.8 (H) 0.0 - 5.6 %   CBC with platelets and differential     Status: None   Result Value Ref Range    WBC Count 8.8 4.0 - 11.0 10e3/uL    RBC Count 5.21 4.40 - 5.90 10e6/uL    Hemoglobin 14.7 13.3 - 17.7 g/dL    Hematocrit 44.5 40.0 - 53.0 %    MCV 85 78 - 100 fL    MCH 28.2 26.5 - 33.0 pg    MCHC 33.0 31.5 - 36.5 g/dL    RDW 14.3 10.0 - 15.0 %    Platelet Count 395 150 - 450 10e3/uL    % Neutrophils 50 %    % Lymphocytes 37 %    % Monocytes 9 %    % Eosinophils 4 %    % Basophils 0 %    % Immature Granulocytes 0 %    Absolute Neutrophils 4.4 1.6 - 8.3 10e3/uL    Absolute Lymphocytes 3.2 0.8 - 5.3 10e3/uL    Absolute Monocytes 0.8 0.0 - 1.3 10e3/uL    Absolute Eosinophils 0.3 0.0 - 0.7 10e3/uL    Absolute Basophils 0.0 0.0 - 0.2 10e3/uL    Absolute Immature Granulocytes 0.0 <=0.4 10e3/uL   CBC with platelets and differential     Status: None    Narrative    The following orders were created for panel order CBC with platelets and differential.  Procedure                               Abnormality         Status                     ---------                               -----------         ------                     CBC with platelets and ...[[id number]]                      Final result                 Please view results for these tests on the  individual orders.

## 2025-04-29 NOTE — PROGRESS NOTES
Urgent Care Clinic Visit    Chief Complaint   Patient presents with    Urgent Care     Feeling low energy  for over a week now. For a while has been having urination issue. Urgency, frequency, burning. Discomfort all over body. Couple of was having a fever here and there. Not having and abdominal pain or flank.                4/29/2025     5:16 PM   Additional Questions   Roomed by Nga   Accompanied by Wife     Pre-Provider Visit Orders- Urinalysis UA/UC  Patient reports the following symptoms:  possible urinary tract infection (UTI) , discomfort, pain or burning with urination , difficulty with urination , and frequent urination   Does the patient report any of the following symptoms: vaginal discharge, vaginal itching, possible yeast infection, has a urinary catheter in place, or unable to void in a specimen cup?  No

## 2025-05-05 LAB — TSH SERPL DL<=0.005 MIU/L-ACNC: 1.3 UIU/ML (ref 0.3–4.2)
